# Patient Record
Sex: FEMALE | Race: WHITE | NOT HISPANIC OR LATINO | URBAN - METROPOLITAN AREA
[De-identification: names, ages, dates, MRNs, and addresses within clinical notes are randomized per-mention and may not be internally consistent; named-entity substitution may affect disease eponyms.]

---

## 2023-04-03 PROBLEM — R14.0 ABDOMINAL BLOATING: Status: ACTIVE | Noted: 2023-04-03

## 2023-04-03 PROBLEM — K62.5 BRBPR (BRIGHT RED BLOOD PER RECTUM): Status: ACTIVE | Noted: 2023-04-03

## 2023-04-03 PROBLEM — R19.7 DIARRHEA: Status: ACTIVE | Noted: 2023-04-03

## 2023-04-03 PROBLEM — F41.9 ANXIETY AND DEPRESSION: Status: ACTIVE | Noted: 2023-04-03

## 2023-04-03 PROBLEM — F32.A ANXIETY AND DEPRESSION: Status: ACTIVE | Noted: 2023-04-03

## 2023-04-03 PROBLEM — E55.9 MILD VITAMIN D DEFICIENCY: Status: ACTIVE | Noted: 2023-04-03

## 2023-04-03 PROBLEM — K64.8 OTHER HEMORRHOIDS: Status: ACTIVE | Noted: 2023-04-03

## 2023-04-03 PROBLEM — R15.0 INCOMPLETE DEFECATION: Status: ACTIVE | Noted: 2023-04-03

## 2023-04-03 PROBLEM — K63.5 HYPERPLASTIC COLON POLYP: Status: ACTIVE | Noted: 2023-04-03

## 2023-04-03 PROBLEM — K58.0 IRRITABLE BOWEL SYNDROME WITH DIARRHEA: Status: ACTIVE | Noted: 2023-04-03

## 2023-04-03 RX ORDER — FLUOXETINE 20 MG/1
1 TABLET ORAL DAILY
COMMUNITY
Start: 2021-01-26 | End: 2023-04-04 | Stop reason: SINTOL

## 2023-04-03 RX ORDER — MULTIVITAMIN
TABLET ORAL
COMMUNITY

## 2023-04-03 RX ORDER — NORETHINDRONE ACETATE AND ETHINYL ESTRADIOL .02; 1 MG/1; MG/1
1 TABLET ORAL DAILY
COMMUNITY
Start: 2019-12-09 | End: 2023-04-04 | Stop reason: SDUPTHER

## 2023-04-04 ENCOUNTER — OFFICE VISIT (OUTPATIENT)
Dept: PRIMARY CARE | Facility: CLINIC | Age: 33
End: 2023-04-04
Payer: COMMERCIAL

## 2023-04-04 VITALS
DIASTOLIC BLOOD PRESSURE: 74 MMHG | BODY MASS INDEX: 41.82 KG/M2 | TEMPERATURE: 97.9 F | WEIGHT: 251 LBS | SYSTOLIC BLOOD PRESSURE: 108 MMHG | OXYGEN SATURATION: 97 % | HEART RATE: 88 BPM | HEIGHT: 65 IN | RESPIRATION RATE: 17 BRPM

## 2023-04-04 DIAGNOSIS — Z30.9 ENCOUNTER FOR CONTRACEPTIVE MANAGEMENT, UNSPECIFIED TYPE: ICD-10-CM

## 2023-04-04 DIAGNOSIS — L30.9 ECZEMA, UNSPECIFIED TYPE: ICD-10-CM

## 2023-04-04 DIAGNOSIS — Z80.0 FAMILY HISTORY OF MALIGNANT NEOPLASM OF COLON IN FATHER: ICD-10-CM

## 2023-04-04 DIAGNOSIS — Z80.8 FAMILY HISTORY OF MALIGNANT MELANOMA: ICD-10-CM

## 2023-04-04 DIAGNOSIS — Z00.00 HEALTHCARE MAINTENANCE: Primary | ICD-10-CM

## 2023-04-04 DIAGNOSIS — H81.10 BENIGN PAROXYSMAL POSITIONAL VERTIGO, UNSPECIFIED LATERALITY: ICD-10-CM

## 2023-04-04 DIAGNOSIS — F32.4 MAJOR DEPRESSIVE DISORDER IN PARTIAL REMISSION, UNSPECIFIED WHETHER RECURRENT (CMS-HCC): ICD-10-CM

## 2023-04-04 PROCEDURE — 99214 OFFICE O/P EST MOD 30 MIN: CPT

## 2023-04-04 PROCEDURE — 1036F TOBACCO NON-USER: CPT

## 2023-04-04 PROCEDURE — 99395 PREV VISIT EST AGE 18-39: CPT

## 2023-04-04 RX ORDER — TRIAMCINOLONE ACETONIDE 1 MG/G
CREAM TOPICAL 2 TIMES DAILY PRN
Qty: 30 G | Refills: 0 | Status: SHIPPED | OUTPATIENT
Start: 2023-04-04

## 2023-04-04 RX ORDER — NORETHINDRONE ACETATE AND ETHINYL ESTRADIOL .02; 1 MG/1; MG/1
1 TABLET ORAL DAILY
Qty: 28 TABLET | Refills: 11 | Status: SHIPPED | OUTPATIENT
Start: 2023-04-04 | End: 2023-04-07 | Stop reason: SDUPTHER

## 2023-04-04 RX ORDER — TRIAMCINOLONE ACETONIDE 1 MG/G
CREAM TOPICAL 2 TIMES DAILY
Qty: 28.4 G | Refills: 0 | Status: SHIPPED | OUTPATIENT
Start: 2023-04-04 | End: 2023-04-04

## 2023-04-04 NOTE — PROGRESS NOTES
Subjective   Alyson Pringle is a 32 y.o. female who is here for physical.    - Rash on L medial lower leg that is dry and itchy  - Ran out of OCP 1 week ago. On Junel for years. Gets regular periods. No personal or fam hx of blood clots. Non-smoker. No migraines with aura. Sexually active with 1 male partner. No condoms. LMP 3/20/23.  - Wants derm referral for annual skin checks due to fam hx of melanoma  - 6 months of vertigo when laying down and turning head that lasts for 20 seconds then self resolves. No other neurological symptoms  - Immunizations: UTD on Tdap (2021). Doesn't get flu or covid shots.   - Mammograms: Paternal Grandmother had breast cancer. No other fam hx of breast or ovarian cancer. No breast concerns. No need for mammogram now.  - Pap smears: 2019 normal, due now  - Colorectal cancer screening: Father had colon cancer, diagnosed at 49. Was having bowel problems in 2021 so had colonoscopy in 2021. Removed polyps, otherwise normal, due in 2026.  - Chlamydia/Gonorrhea testing: Not interested   - 1 time Hep C testing: done  - 1 time HIV testing: done  - Diet: Well balanced  - Exercise: Infrequently   - Alcohol/tobacco/drugs: Social alcohol use. No tobacco or illicits.  - Dental care: UTD  - Vision care: UTD  - Periods: Regular  - Contraception: OCP  - Mood: Ok. Stopped prozac in the past. Interested in therapy now    Active Problem List      Comprehensive Medical/Surgical/Social/Family History  No past medical history on file.  Past Surgical History:   Procedure Laterality Date    OTHER SURGICAL HISTORY  12/09/2019    No history of surgery     Social History     Social History Narrative    Not on file       Allergies and Medications  Patient has no known allergies.  Current Outpatient Medications on File Prior to Visit   Medication Sig Dispense Refill    multivitamin tablet Take by mouth.      [DISCONTINUED] FLUoxetine (PROzac) 20 mg tablet Take 1 tablet (20 mg) by mouth once daily.       "[DISCONTINUED] norethindrone ac-eth estradioL (Microgestin 1/20) 1-20 mg-mcg tablet Take 1 tablet by mouth once daily.       No current facility-administered medications on file prior to visit.       Objective   /74 (BP Location: Left arm, Patient Position: Sitting, BP Cuff Size: Large adult)   Pulse 88   Temp 36.6 °C (97.9 °F)   Resp 17   Ht 1.651 m (5' 5\")   Wt 114 kg (251 lb)   LMP 03/20/2023   SpO2 97%   BMI 41.77 kg/m²    PHYSICAL EXAM  Gen: Well appearing, in NAD  Eyes: EOMI  HEENT: MMM. TMs normal. Throat normal. Sharon-hallpike positive  Heart: RRR, no murmurs  Lungs: No increased work of breathing, CTAB, on RA  GI: Soft, NTND, no guarding or rebound  Extremities: WWP, cap refill <2sec, no pitting edema in LE b/l  Neuro: Alert, symmetrical facies, moves all extremities equally  Skin: Eczematous patch on L medial lower leg that is dry and itchy  Psych: Appropriate mood and affect    Assessment/Plan   - Reviewed Social Determinants of health with patient. Discussed healthy lifestyle, including 150 minutes of physical activity per week.  - Follow up for pap as soon as possible     Problem List Items Addressed This Visit          Infectious/Inflammatory    Eczema    Relevant Medications    triamcinolone (Kenalog) 0.1 % cream       Other    Major depressive disorder in partial remission (CMS/HCC)    Relevant Orders    Referral to Psychology    Encounter for contraceptive management    Relevant Medications    norethindrone ac-eth estradioL (Microgestin 1/20) 1-20 mg-mcg tablet    Family history of malignant melanoma    Relevant Orders    Referral to Dermatology    Healthcare maintenance - Primary    Relevant Orders    CBC    Comprehensive Metabolic Panel    Hemoglobin A1C    Lipid Panel    TSH with reflex to Free T4 if abnormal    Benign paroxysmal positional vertigo     Sharon-hallpike positive. Pt declined prn meclizine. Gave home epley maneuver handout. Referral to vestibular PT given.         Relevant " Orders    Referral to Physical Therapy    Family history of malignant neoplasm of colon in father     Continue screening colonoscopies, next due 2026           Joyce Mccann DO  Family Medicine Resident, PGY-2  [facility]  [address] [place] [address] [phone]

## 2023-04-04 NOTE — TELEPHONE ENCOUNTER
----- Message from Joyce Mccann DO sent at 4/4/2023 11:20 AM EDT -----  Please call pt and remind her that she is overdue for a pap so she can either return here for that or I can refer her to obgyn. Thanks!

## 2023-04-04 NOTE — ASSESSMENT & PLAN NOTE
Barnsd[place]-hallpike positive. Pt declined prn meclizine. Gave home epley maneuver handout. Referral to vestibular PT given.

## 2023-04-05 NOTE — PROGRESS NOTES
I saw and evaluated the patient. I personally obtained the key and critical portions of the history and physical exam or was physically present for key and critical portions performed by the resident/fellow. I reviewed the resident/fellow's documentation and discussed the patient with the resident/fellow. I agree with the resident/fellow's medical decision making as documented in the note.    Hung Stevens, DO

## 2023-04-06 ENCOUNTER — TELEPHONE (OUTPATIENT)
Dept: PRIMARY CARE | Facility: CLINIC | Age: 33
End: 2023-04-06
Payer: COMMERCIAL

## 2023-04-06 DIAGNOSIS — Z30.9 ENCOUNTER FOR CONTRACEPTIVE MANAGEMENT, UNSPECIFIED TYPE: ICD-10-CM

## 2023-04-06 NOTE — TELEPHONE ENCOUNTER
Pt called because pharmacy said they did not have a prescription for her birth control. I called them and they do in fact have the prescription however, it is not entered correctly. You put it in for a 28 day supply but what you prescribed only comes in a 21-day pack. They said that Dr Guzmán had to send it in as 3 21-day packs (63 days continuous) with refills. They are suggesting that so that it can be filled.

## 2023-04-07 RX ORDER — NORETHINDRONE ACETATE AND ETHINYL ESTRADIOL .02; 1 MG/1; MG/1
1 TABLET ORAL DAILY
Qty: 21 TABLET | Refills: 11 | Status: SHIPPED | OUTPATIENT
Start: 2023-04-07 | End: 2023-05-07

## 2023-04-11 ENCOUNTER — LAB (OUTPATIENT)
Dept: LAB | Facility: LAB | Age: 33
End: 2023-04-11
Payer: COMMERCIAL

## 2023-04-11 ENCOUNTER — PROCEDURE VISIT (OUTPATIENT)
Dept: PRIMARY CARE | Facility: CLINIC | Age: 33
End: 2023-04-11
Payer: COMMERCIAL

## 2023-04-11 VITALS
SYSTOLIC BLOOD PRESSURE: 112 MMHG | DIASTOLIC BLOOD PRESSURE: 70 MMHG | HEART RATE: 74 BPM | OXYGEN SATURATION: 96 % | TEMPERATURE: 98.2 F | BODY MASS INDEX: 41.69 KG/M2 | WEIGHT: 250.5 LBS | RESPIRATION RATE: 16 BRPM

## 2023-04-11 DIAGNOSIS — Z12.4 CERVICAL CANCER SCREENING: Primary | ICD-10-CM

## 2023-04-11 DIAGNOSIS — Z00.00 HEALTHCARE MAINTENANCE: ICD-10-CM

## 2023-04-11 PROCEDURE — 36415 COLL VENOUS BLD VENIPUNCTURE: CPT

## 2023-04-11 PROCEDURE — 85027 COMPLETE CBC AUTOMATED: CPT

## 2023-04-11 PROCEDURE — 84443 ASSAY THYROID STIM HORMONE: CPT

## 2023-04-11 PROCEDURE — 87624 HPV HI-RISK TYP POOLED RSLT: CPT

## 2023-04-11 PROCEDURE — 80053 COMPREHEN METABOLIC PANEL: CPT

## 2023-04-11 PROCEDURE — 88175 CYTOPATH C/V AUTO FLUID REDO: CPT

## 2023-04-11 PROCEDURE — 99213 OFFICE O/P EST LOW 20 MIN: CPT

## 2023-04-11 PROCEDURE — 80061 LIPID PANEL: CPT

## 2023-04-11 PROCEDURE — 83036 HEMOGLOBIN GLYCOSYLATED A1C: CPT

## 2023-04-11 ASSESSMENT — ENCOUNTER SYMPTOMS
OCCASIONAL FEELINGS OF UNSTEADINESS: 0
DEPRESSION: 0
LOSS OF SENSATION IN FEET: 0

## 2023-04-11 NOTE — PROGRESS NOTES
Subjective   Alyson Pringle is a 32 y.o. female who presents for Gynecologic Exam.  Pt presents for a pap smear. Last one was 2019 and normal, due now.     Objective   /70 (BP Location: Right arm, Patient Position: Sitting)   Pulse 74   Temp 36.8 °C (98.2 °F) (Temporal)   Resp 16   Wt 114 kg (250 lb 8 oz)   LMP 03/20/2023   SpO2 96%   BMI 41.69 kg/m²    PHYSICAL EXAM  Gen: Well appearing, in NAD  Eyes: EOMI  Lungs: No increased work of breathing, on RA  Pelvic: Normal vaginal exam. Pap performed.  Neuro: Alert, symmetrical facies, moves all extremities equally  Psych: Appropriate mood and affect    Assessment/Plan     Problem List Items Addressed This Visit          Other    Cervical cancer screening - Primary     Last pap 2019 & normal. Performed today.         Relevant Orders    THINPREP PAP TEST      Joyce Mccann DO  Family Medicine Resident, PGY-2  [facility]  [address] [place] [address] [phone]

## 2023-04-12 LAB
ALANINE AMINOTRANSFERASE (SGPT) (U/L) IN SER/PLAS: 15 U/L (ref 7–45)
ALBUMIN (G/DL) IN SER/PLAS: 4.1 G/DL (ref 3.4–5)
ALKALINE PHOSPHATASE (U/L) IN SER/PLAS: 62 U/L (ref 33–110)
ANION GAP IN SER/PLAS: 13 MMOL/L (ref 10–20)
ASPARTATE AMINOTRANSFERASE (SGOT) (U/L) IN SER/PLAS: 15 U/L (ref 9–39)
BILIRUBIN TOTAL (MG/DL) IN SER/PLAS: 0.4 MG/DL (ref 0–1.2)
CALCIUM (MG/DL) IN SER/PLAS: 9.1 MG/DL (ref 8.6–10.6)
CARBON DIOXIDE, TOTAL (MMOL/L) IN SER/PLAS: 26 MMOL/L (ref 21–32)
CHLORIDE (MMOL/L) IN SER/PLAS: 104 MMOL/L (ref 98–107)
CHOLESTEROL (MG/DL) IN SER/PLAS: 181 MG/DL (ref 0–199)
CHOLESTEROL IN HDL (MG/DL) IN SER/PLAS: 60.3 MG/DL
CHOLESTEROL/HDL RATIO: 3
CREATININE (MG/DL) IN SER/PLAS: 0.66 MG/DL (ref 0.5–1.05)
ERYTHROCYTE DISTRIBUTION WIDTH (RATIO) BY AUTOMATED COUNT: 12.5 % (ref 11.5–14.5)
ERYTHROCYTE MEAN CORPUSCULAR HEMOGLOBIN CONCENTRATION (G/DL) BY AUTOMATED: 31.1 G/DL (ref 32–36)
ERYTHROCYTE MEAN CORPUSCULAR VOLUME (FL) BY AUTOMATED COUNT: 87 FL (ref 80–100)
ERYTHROCYTES (10*6/UL) IN BLOOD BY AUTOMATED COUNT: 4.7 X10E12/L (ref 4–5.2)
ESTIMATED AVERAGE GLUCOSE FOR HBA1C: 114 MG/DL
GFR FEMALE: >90 ML/MIN/1.73M2
GLUCOSE (MG/DL) IN SER/PLAS: 80 MG/DL (ref 74–99)
HEMATOCRIT (%) IN BLOOD BY AUTOMATED COUNT: 41.1 % (ref 36–46)
HEMOGLOBIN (G/DL) IN BLOOD: 12.8 G/DL (ref 12–16)
HEMOGLOBIN A1C/HEMOGLOBIN TOTAL IN BLOOD: 5.6 %
LDL: 107 MG/DL (ref 0–99)
LEUKOCYTES (10*3/UL) IN BLOOD BY AUTOMATED COUNT: 9.9 X10E9/L (ref 4.4–11.3)
PLATELETS (10*3/UL) IN BLOOD AUTOMATED COUNT: 303 X10E9/L (ref 150–450)
POTASSIUM (MMOL/L) IN SER/PLAS: 4.5 MMOL/L (ref 3.5–5.3)
PROTEIN TOTAL: 6.9 G/DL (ref 6.4–8.2)
SODIUM (MMOL/L) IN SER/PLAS: 138 MMOL/L (ref 136–145)
THYROTROPIN (MIU/L) IN SER/PLAS BY DETECTION LIMIT <= 0.05 MIU/L: 3.09 MIU/L (ref 0.44–3.98)
TRIGLYCERIDE (MG/DL) IN SER/PLAS: 70 MG/DL (ref 0–149)
UREA NITROGEN (MG/DL) IN SER/PLAS: 12 MG/DL (ref 6–23)
VLDL: 14 MG/DL (ref 0–40)

## 2023-04-12 NOTE — PROGRESS NOTES
I reviewed with the resident the medical history and the resident’s findings on physical examination.  I discussed with the resident the patient’s diagnosis and concur with the treatment plan as documented in the resident note.     Hung Stevens, DO

## 2023-04-18 LAB
COMPLETE PATHOLOGY REPORT: NORMAL
CONVERTED CLINICAL DIAGNOSIS-HISTORY: NORMAL
CONVERTED DIAGNOSIS COMMENT: NORMAL
CONVERTED FINAL DIAGNOSIS: NORMAL
CONVERTED FINAL REPORT PDF LINK TO COPY AND PASTE: NORMAL

## 2023-04-20 ENCOUNTER — TELEPHONE (OUTPATIENT)
Dept: PRIMARY CARE | Facility: CLINIC | Age: 33
End: 2023-04-20
Payer: COMMERCIAL

## 2023-04-20 NOTE — TELEPHONE ENCOUNTER
----- Message from Joyce Mccann DO sent at 4/14/2023 10:14 AM EDT -----  Please let pt know that all of her labs are normal except her bad cholesterol (LDL) is a tiny bit elevated (but it's much improved from the last time it was checked) so to continue to work on healthy diet and exercise. Her pap smear results are not back yet but we will let her know those results once we know them. Thanks!